# Patient Record
Sex: FEMALE | Race: BLACK OR AFRICAN AMERICAN | Employment: OTHER | URBAN - METROPOLITAN AREA
[De-identification: names, ages, dates, MRNs, and addresses within clinical notes are randomized per-mention and may not be internally consistent; named-entity substitution may affect disease eponyms.]

---

## 2017-08-16 ENCOUNTER — LAB ENCOUNTER (OUTPATIENT)
Dept: LAB | Age: 77
End: 2017-08-16
Attending: INTERNAL MEDICINE
Payer: MEDICARE

## 2017-08-16 DIAGNOSIS — R19.7 DIARRHEA: ICD-10-CM

## 2017-08-16 DIAGNOSIS — K21.9 GASTROESOPHAGEAL REFLUX DISEASE: ICD-10-CM

## 2017-08-16 DIAGNOSIS — R10.9 STOMACH ACHE: Primary | ICD-10-CM

## 2017-08-16 DIAGNOSIS — A04.72 C. DIFFICILE DIARRHEA: ICD-10-CM

## 2017-08-16 PROBLEM — I70.0 ABDOMINAL AORTIC ATHEROSCLEROSIS (HCC): Status: ACTIVE | Noted: 2017-08-16

## 2017-08-16 PROBLEM — Z86.19 HISTORY OF CLOSTRIDIUM DIFFICILE COLITIS: Status: ACTIVE | Noted: 2017-08-16

## 2017-08-16 PROBLEM — L28.0 LICHENOID DERMATITIS: Status: ACTIVE | Noted: 2017-08-16

## 2017-08-16 LAB
BILIRUB UR QL STRIP.AUTO: NEGATIVE
CLARITY UR REFRACT.AUTO: CLEAR
COLOR UR AUTO: YELLOW
GLUCOSE UR STRIP.AUTO-MCNC: NEGATIVE MG/DL
KETONES UR STRIP.AUTO-MCNC: NEGATIVE MG/DL
LEUKOCYTE ESTERASE UR QL STRIP.AUTO: NEGATIVE
NITRITE UR QL STRIP.AUTO: NEGATIVE
PH UR STRIP.AUTO: 6 [PH] (ref 4.5–8)
PROT UR STRIP.AUTO-MCNC: NEGATIVE MG/DL
RBC UR QL AUTO: NEGATIVE
SP GR UR STRIP.AUTO: 1.01 (ref 1–1.03)
UROBILINOGEN UR STRIP.AUTO-MCNC: <2 MG/DL

## 2017-08-16 PROCEDURE — 81003 URINALYSIS AUTO W/O SCOPE: CPT

## 2018-01-25 PROBLEM — I70.0 AORTIC CALCIFICATION (HCC): Status: ACTIVE | Noted: 2018-01-25

## 2018-01-25 PROBLEM — M47.816 LUMBAR FACET ARTHROPATHY: Status: ACTIVE | Noted: 2018-01-25

## 2019-03-21 PROBLEM — J84.10 PULMONARY GRANULOMA (HCC): Status: ACTIVE | Noted: 2019-03-21

## 2019-03-21 PROBLEM — M47.816 FACET ARTHRITIS OF LUMBAR REGION: Status: ACTIVE | Noted: 2019-03-21

## 2019-04-22 ENCOUNTER — HOSPITAL ENCOUNTER (OUTPATIENT)
Age: 79
Discharge: HOME | End: 2019-04-22
Attending: FAMILY MEDICINE
Payer: MEDICARE

## 2019-04-22 VITALS
WEIGHT: 115 LBS | BODY MASS INDEX: 22 KG/M2 | HEART RATE: 118 BPM | OXYGEN SATURATION: 96 % | SYSTOLIC BLOOD PRESSURE: 135 MMHG | TEMPERATURE: 98 F | DIASTOLIC BLOOD PRESSURE: 69 MMHG | RESPIRATION RATE: 18 BRPM
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
## 2019-04-22 DIAGNOSIS — S51.852A DOG BITE OF LEFT FOREARM, INITIAL ENCOUNTER: Primary | ICD-10-CM

## 2019-04-22 PROCEDURE — 99202 OFFICE O/P NEW SF 15 MIN: CPT

## 2019-04-22 PROCEDURE — 99203 OFFICE O/P NEW LOW 30 MIN: CPT

## 2019-04-22 RX ORDER — DOXYCYCLINE HYCLATE 100 MG/1
100 CAPSULE ORAL 2 TIMES DAILY
Qty: 20 CAPSULE | Refills: 0 | Status: SHIPPED | OUTPATIENT
Start: 2019-04-22 | End: 2019-05-02

## 2019-04-22 NOTE — ED PROVIDER NOTES
Patient Seen in: [facility] [place]    History   Patient presents with:  Bite (integumentary)    Stated Complaint: dog bite    HPI    Pt is an 77 yo with a Dog bite PTA. It is her neighbors dog and it is UTD on immunizations.  Pt Pulse 118   Temp 98.1 °F (36.7 °C) (Oral)   Resp 18   Wt 52.2 kg   SpO2 96%   BMI 22.46 kg/m²         Physical Exam   Constitutional: She is oriented to person, place, and time. She appears well-developed and well-nourished. HENT:   Head: Normocephalic.

## 2019-04-22 NOTE — ED INITIAL ASSESSMENT (HPI)
Pt was bit by neighbors dog a few hours ago. One puncture wound to L arm. Dog is up to date with shots.

## 2019-04-25 PROBLEM — H25.813 COMBINED FORMS OF AGE-RELATED CATARACT, BILATERAL: Status: ACTIVE | Noted: 2019-04-25

## 2019-04-25 PROBLEM — H04.123 DRY EYE SYNDROME OF BILATERAL LACRIMAL GLANDS: Status: ACTIVE | Noted: 2019-04-25

## 2019-04-25 PROBLEM — D31.22: Status: ACTIVE | Noted: 2019-04-25

## 2019-04-25 PROBLEM — H43.813 PVD (POSTERIOR VITREOUS DETACHMENT), BILATERAL: Status: ACTIVE | Noted: 2019-04-25

## 2020-07-01 PROBLEM — I70.0 ABDOMINAL AORTIC ATHEROSCLEROSIS (HCC): Status: RESOLVED | Noted: 2017-08-16 | Resolved: 2020-07-01

## 2021-08-04 ENCOUNTER — HOSPITAL ENCOUNTER (EMERGENCY)
Facility: HOSPITAL | Age: 81
Discharge: HOME | End: 2021-08-04
Attending: EMERGENCY MEDICINE
Payer: MEDICARE

## 2021-08-04 ENCOUNTER — APPOINTMENT (OUTPATIENT)
Dept: CT IMAGING | Facility: HOSPITAL | Age: 81
End: 2021-08-04
Attending: EMERGENCY MEDICINE
Payer: MEDICARE

## 2021-08-04 ENCOUNTER — HOSPITAL ENCOUNTER (OUTPATIENT)
Age: 81
Discharge: ANOTHER HOSPITAL | End: 2021-08-04
Payer: MEDICARE

## 2021-08-04 VITALS
RESPIRATION RATE: 18 BRPM | HEIGHT: 61 IN | OXYGEN SATURATION: 97 % | WEIGHT: 116 LBS | HEART RATE: 79 BPM | BODY MASS INDEX: 21.9 KG/M2 | SYSTOLIC BLOOD PRESSURE: 158 MMHG | TEMPERATURE: 98 F | DIASTOLIC BLOOD PRESSURE: 66 MMHG

## 2021-08-04 VITALS
OXYGEN SATURATION: 98 % | TEMPERATURE: 98 F | RESPIRATION RATE: 18 BRPM | SYSTOLIC BLOOD PRESSURE: 150 MMHG | HEART RATE: 64 BPM | DIASTOLIC BLOOD PRESSURE: 68 MMHG | BODY MASS INDEX: 23.39 KG/M2 | HEIGHT: 61 IN | WEIGHT: 123.88 LBS

## 2021-08-04 DIAGNOSIS — R10.9 ABDOMINAL PAIN, ACUTE: Primary | ICD-10-CM

## 2021-08-04 LAB
ALBUMIN SERPL-MCNC: 3.5 G/DL (ref 3.4–5)
ALBUMIN/GLOB SERPL: 0.9 {RATIO} (ref 1–2)
ALP LIVER SERPL-CCNC: 55 U/L
ALT SERPL-CCNC: 21 U/L
ANION GAP SERPL CALC-SCNC: 8 MMOL/L (ref 0–18)
AST SERPL-CCNC: 17 U/L (ref 15–37)
BASOPHILS # BLD AUTO: 0.04 X10(3) UL (ref 0–0.2)
BASOPHILS NFR BLD AUTO: 0.5 %
BILIRUB SERPL-MCNC: 0.6 MG/DL (ref 0.1–2)
BILIRUB UR QL: NEGATIVE
BUN BLD-MCNC: 12 MG/DL (ref 7–18)
BUN/CREAT SERPL: 13.6 (ref 10–20)
CALCIUM BLD-MCNC: 9.5 MG/DL (ref 8.5–10.1)
CHLORIDE SERPL-SCNC: 110 MMOL/L (ref 98–112)
CLARITY UR: CLEAR
CO2 SERPL-SCNC: 24 MMOL/L (ref 21–32)
COLOR UR: YELLOW
CREAT BLD-MCNC: 0.88 MG/DL
DEPRECATED RDW RBC AUTO: 45.4 FL (ref 35.1–46.3)
EOSINOPHIL # BLD AUTO: 0.67 X10(3) UL (ref 0–0.7)
EOSINOPHIL NFR BLD AUTO: 7.9 %
ERYTHROCYTE [DISTWIDTH] IN BLOOD BY AUTOMATED COUNT: 13.2 % (ref 11–15)
GLOBULIN PLAS-MCNC: 3.8 G/DL (ref 2.8–4.4)
GLUCOSE BLD-MCNC: 89 MG/DL (ref 70–99)
GLUCOSE UR-MCNC: NEGATIVE MG/DL
HAV IGM SER QL: 2.3 MG/DL (ref 1.6–2.6)
HCT VFR BLD AUTO: 39.8 %
HGB BLD-MCNC: 13.5 G/DL
HGB UR QL STRIP.AUTO: NEGATIVE
IMM GRANULOCYTES # BLD AUTO: 0.03 X10(3) UL (ref 0–1)
IMM GRANULOCYTES NFR BLD: 0.4 %
LEUKOCYTE ESTERASE UR QL STRIP.AUTO: NEGATIVE
LIPASE SERPL-CCNC: 85 U/L (ref 73–393)
LYMPHOCYTES # BLD AUTO: 1.18 X10(3) UL (ref 1–4)
LYMPHOCYTES NFR BLD AUTO: 13.9 %
M PROTEIN MFR SERPL ELPH: 7.3 G/DL (ref 6.4–8.2)
MCH RBC QN AUTO: 31.6 PG (ref 26–34)
MCHC RBC AUTO-ENTMCNC: 33.9 G/DL (ref 31–37)
MCV RBC AUTO: 93.2 FL
MONOCYTES # BLD AUTO: 0.91 X10(3) UL (ref 0.1–1)
MONOCYTES NFR BLD AUTO: 10.7 %
NEUTROPHILS # BLD AUTO: 5.68 X10 (3) UL (ref 1.5–7.7)
NEUTROPHILS # BLD AUTO: 5.68 X10(3) UL (ref 1.5–7.7)
NEUTROPHILS NFR BLD AUTO: 66.6 %
NITRITE UR QL STRIP.AUTO: NEGATIVE
OSMOLALITY SERPL CALC.SUM OF ELEC: 293 MOSM/KG (ref 275–295)
PH UR: 6 [PH] (ref 5–8)
PLATELET # BLD AUTO: 234 10(3)UL (ref 150–450)
POTASSIUM SERPL-SCNC: 3.6 MMOL/L (ref 3.5–5.1)
PROT UR-MCNC: NEGATIVE MG/DL
RBC # BLD AUTO: 4.27 X10(6)UL
SODIUM SERPL-SCNC: 142 MMOL/L (ref 136–145)
SP GR UR STRIP: 1.01 (ref 1–1.03)
TROPONIN I SERPL-MCNC: <0.045 NG/ML (ref ?–0.04)
UROBILINOGEN UR STRIP-ACNC: <2
WBC # BLD AUTO: 8.5 X10(3) UL (ref 4–11)

## 2021-08-04 PROCEDURE — 96374 THER/PROPH/DIAG INJ IV PUSH: CPT

## 2021-08-04 PROCEDURE — 80053 COMPREHEN METABOLIC PANEL: CPT | Performed by: EMERGENCY MEDICINE

## 2021-08-04 PROCEDURE — 93000 ELECTROCARDIOGRAM COMPLETE: CPT | Performed by: NURSE PRACTITIONER

## 2021-08-04 PROCEDURE — 74177 CT ABD & PELVIS W/CONTRAST: CPT | Performed by: EMERGENCY MEDICINE

## 2021-08-04 PROCEDURE — 83690 ASSAY OF LIPASE: CPT | Performed by: EMERGENCY MEDICINE

## 2021-08-04 PROCEDURE — 85025 COMPLETE CBC W/AUTO DIFF WBC: CPT | Performed by: EMERGENCY MEDICINE

## 2021-08-04 PROCEDURE — 81003 URINALYSIS AUTO W/O SCOPE: CPT | Performed by: EMERGENCY MEDICINE

## 2021-08-04 PROCEDURE — 83735 ASSAY OF MAGNESIUM: CPT | Performed by: EMERGENCY MEDICINE

## 2021-08-04 PROCEDURE — 99215 OFFICE O/P EST HI 40 MIN: CPT | Performed by: NURSE PRACTITIONER

## 2021-08-04 PROCEDURE — 84484 ASSAY OF TROPONIN QUANT: CPT | Performed by: EMERGENCY MEDICINE

## 2021-08-04 PROCEDURE — 99284 EMERGENCY DEPT VISIT MOD MDM: CPT

## 2021-08-04 RX ORDER — MORPHINE SULFATE 4 MG/ML
2 INJECTION, SOLUTION INTRAMUSCULAR; INTRAVENOUS ONCE
Status: DISCONTINUED | OUTPATIENT
Start: 2021-08-04 | End: 2021-08-04

## 2021-08-04 RX ORDER — DICYCLOMINE HCL 20 MG
20 TABLET ORAL 4 TIMES DAILY PRN
Qty: 30 TABLET | Refills: 0 | Status: SHIPPED | OUTPATIENT
Start: 2021-08-04 | End: 2021-08-17

## 2021-08-04 NOTE — ED PROVIDER NOTES
Patient Seen in: [facility] Emergency Department      History   Patient presents with:  Abdomen/Flank Pain    Stated Complaint: abdominal pain     HPI/Subjective:   HPI    47-year-old female presents for evaluation for abdominal pain.   Patient sta [facility] PREOPERATIVE ORDER FOR IV ANTIBIOTIC SURGICAL SITE INFECTION PROPHYLAXIS.  5/29/2013    Procedure: COLONOSCOPY, POSSIBLE BIOPSY, POSSIBLE POLYPECTOMY 41900;  Surgeon: Benjamin Gonzales MD;  Location: [place] [address] right lower quadrant. There is no right CVA tenderness, left CVA tenderness, guarding or rebound. Musculoskeletal:         General: No deformity. Normal range of motion.       Cervical back: Full passive range of motion without pain and normal range of mo for immediate care reviewed and also unremarkable. CT imaging negative for any acute findings. Patient was no longer symptomatic. Unsure etiology for his symptoms at this time however she is stable for discharge home.   She is given prescription for [facility] atrophy. ADRENALS: No defined mass or abnormal enlargement. KIDNEYS: Enhance symmetrically without hydronephrosis. Small bilateral renal cysts are noted including a lower pole right renal cyst measuring up to 1.5 cm.   Additional hypodensities in the kid Disposition and Plan     Clinical Impression:  Abdominal pain, acute  (primary encounter diagnosis)     Disposition:  Discharge  8/4/2021  6:11 pm    Follow-up:  Jenny López MD  [address]

## 2021-08-04 NOTE — ED INITIAL ASSESSMENT (HPI)
Pt to ED via EMS from [address] [place] [address] Way with c/o intermittent mid epigastric pain/generalized abdominal cramping for 2 days. Pt states not passing gas. Pt states decreased Bowel movements. Pt denies fall or trauma. Pt denies diarrhea or vomiting. Pt skin parameters WNL.

## 2021-08-04 NOTE — ED PROVIDER NOTES
Patient presents with:  Abdominal Pain      HPI:     This 78year old female presents with a chief complaint of abdominal and chest pain that started yesterday. The patient states the pain has gradually become worse.   She has episodes of pain that she is Yes        Bike Helmet: Not Asked        Seat Belt: Yes        Self-Exams: Yes        Grew up on a farm: Not Asked        History of tanning: Not Asked        Outdoor occupation: Not Asked        Pt has a pacemaker: Not Asked        Pt has a defibrillator: Nonlabored resps. CARDIO: RRR without murmur  EXTREMITIES: no cyanosis or edema. CONRAD without difficulty  GI: Abdomen soft, tender to palpate over the entire abdomen. Bowel sounds normal. No CVA tenderness. NEURO: No deficits.     MDM/Assessment/Plan:   George Devries Troponin I    Collection Time: 08/04/21  3:13 PM   Result Value Ref Range    Troponin <0.045 <0.045 ng/mL   CBC W/ DIFFERENTIAL    Collection Time: 08/04/21  3:13 PM   Result Value Ref Range    WBC 8.5 4.0 - 11.0 x10(3) uL    RBC 4.27 3.80 - 5.30 x10(6)u

## 2021-08-16 PROBLEM — I77.1 TORTUOUS AORTA (HCC): Status: ACTIVE | Noted: 2021-08-16

## 2023-11-28 ENCOUNTER — OFFICE VISIT (OUTPATIENT)
Dept: FAMILY MEDICINE CLINIC | Facility: CLINIC | Age: 83
End: 2023-11-28

## 2023-11-28 VITALS
SYSTOLIC BLOOD PRESSURE: 130 MMHG | BODY MASS INDEX: 22.47 KG/M2 | HEART RATE: 80 BPM | WEIGHT: 119 LBS | DIASTOLIC BLOOD PRESSURE: 86 MMHG | HEIGHT: 61 IN

## 2023-11-28 DIAGNOSIS — R19.00 MASS OF SOFT TISSUE OF ABDOMEN: Primary | ICD-10-CM

## 2023-11-28 DIAGNOSIS — L30.9 DERMATITIS: ICD-10-CM

## 2023-11-28 PROCEDURE — 1126F AMNT PAIN NOTED NONE PRSNT: CPT | Performed by: NURSE PRACTITIONER

## 2023-11-28 PROCEDURE — 3079F DIAST BP 80-89 MM HG: CPT | Performed by: NURSE PRACTITIONER

## 2023-11-28 PROCEDURE — 99204 OFFICE O/P NEW MOD 45 MIN: CPT | Performed by: NURSE PRACTITIONER

## 2023-11-28 PROCEDURE — 3075F SYST BP GE 130 - 139MM HG: CPT | Performed by: NURSE PRACTITIONER

## 2023-11-28 PROCEDURE — 1159F MED LIST DOCD IN RCRD: CPT | Performed by: NURSE PRACTITIONER

## 2023-11-28 PROCEDURE — 1160F RVW MEDS BY RX/DR IN RCRD: CPT | Performed by: NURSE PRACTITIONER

## 2023-11-28 PROCEDURE — 3008F BODY MASS INDEX DOCD: CPT | Performed by: NURSE PRACTITIONER

## 2023-11-28 RX ORDER — TRIAMCINOLONE ACETONIDE 5 MG/G
1 OINTMENT TOPICAL 2 TIMES DAILY PRN
Qty: 30 G | Refills: 1 | Status: SHIPPED | OUTPATIENT
Start: 2023-11-28

## 2023-12-29 ENCOUNTER — TELEPHONE (OUTPATIENT)
Dept: SURGERY | Facility: CLINIC | Age: 83
End: 2023-12-29

## 2024-01-08 ENCOUNTER — OFFICE VISIT (OUTPATIENT)
Dept: SURGERY | Facility: CLINIC | Age: 84
End: 2024-01-08

## 2024-01-08 VITALS — HEIGHT: 61 IN | BODY MASS INDEX: 22.47 KG/M2 | WEIGHT: 119 LBS

## 2024-01-08 DIAGNOSIS — M79.89 SOFT TISSUE MASS: Primary | ICD-10-CM

## 2024-01-08 PROCEDURE — 99204 OFFICE O/P NEW MOD 45 MIN: CPT | Performed by: SURGERY

## 2024-01-08 PROCEDURE — 1159F MED LIST DOCD IN RCRD: CPT | Performed by: SURGERY

## 2024-01-08 PROCEDURE — 1160F RVW MEDS BY RX/DR IN RCRD: CPT | Performed by: SURGERY

## 2024-01-08 PROCEDURE — 3008F BODY MASS INDEX DOCD: CPT | Performed by: SURGERY

## 2024-01-08 NOTE — H&P
Chief complaint:   Chief Complaint   Patient presents with    Mass     Mass, Left side       HPI: Cassi has a soft tissue mass of the L flank area. Fairly stable, present for years.     Past medical history:   Past Medical History:   Diagnosis Date    Abdominal aortic atherosclerosis (HCC) 2017    Anxiety     Aortic calcification (HCC) 2018    Clostridium difficile colitis 2013    Facet arthritis of lumbar region 3/21/2019    History of Clostridium difficile colitis 2017    Lichenoid dermatitis 2017    Lumbar facet arthropathy 2018    MENOPAUSE     Pulmonary granuloma (HCC) 3/21/2019       Past surgical history:   Past Surgical History:   Procedure Laterality Date    COLONOSCOPY      nl    COLONOSCOPY      diverticulosis; no repeat given age    COLONOSCOPY,BIOPSY  2013    Procedure: COLONOSCOPY, POSSIBLE BIOPSY, POSSIBLE POLYPECTOMY 21483;  Surgeon: Bernard Carrera MD;  Location: [facility]    EXCISIONAL BIOPSY LEFT      agea 18 abcess removed per pt    HYSTERECTOMY  1971    PARTIAL HYSTERCTOMY FOR FIBROIDS          OTHER SURGICAL HISTORY      LIPOMA-RIGHT UPPER BACK    OTHER SURGICAL HISTORY      LEFT BREAST ABSCESS    PATIENT DOCUMENTED NOT TO HAVE EXPERIENCED ANY OF THE FOLLOWING EVENTS  2013    Procedure: COLONOSCOPY, POSSIBLE BIOPSY, POSSIBLE POLYPECTOMY 60395;  Surgeon: Bernard Carrera MD;  Location: [facility]    PATIENT WITHOUGH PREOPERATIVE ORDER FOR IV ANTIBIOTIC SURGICAL SITE INFECTION PROPHYLAXIS.  2013    Procedure: COLONOSCOPY, POSSIBLE BIOPSY, POSSIBLE POLYPECTOMY 19100;  Surgeon: Bernard Carrera MD;  Location: [facility]       Allergies:   Allergies   Allergen Reactions    Sulfa Antibiotics HIVES    Pcn [Penicillins] SWELLING       Medications:   Current Outpatient Medications   Medication Sig Dispense Refill    triamcinolone 0.5 % External Ointment Apply 1 Application topically 2 (two)  times daily as needed. 30 g 1    amLODIPine 5 MG Oral Tab Take 1 tablet (5 mg total) by mouth daily. 30 tablet 0    saccharomyces boulardii (FLORASTOR) 250 MG Oral Cap Take 1 capsule (250 mg total) by mouth 2 (two) times daily for 7 days. 180 capsule 3       Social history:   Social History     Socioeconomic History    Marital status:    Occupational History    Occupation: RETIRED   Tobacco Use    Smoking status: Never    Smokeless tobacco: Never   Substance and Sexual Activity    Alcohol use: Yes     Alcohol/week: 10.0 standard drinks of alcohol     Types: 10 Standard drinks or equivalent per week     Comment: moderate    Drug use: No    Sexual activity: Yes     Partners: Male     Comment: Jehovah's witness        Family history:  Family History   Problem Relation Age of Onset    Hypertension Mother     Obesity Mother     Heart Disorder Mother     Other (Other) Son         EARLY HIP PROBLEMS    No Known Problems Daughter     No Known Problems Son     Cancer Brother         bone     Other (lead poisoning) Son     Obesity Brother         Review of Systems:   GENERAL: feels generally well  SKIN: no ulcerated or worrisome skin lesions  EYES:denies blurred vision or double vision  HEENT: denies new nasal congestion, sinus pain or ST  LUNGS: denies shortness of breath with exertion  CARDIOVASCULAR: denies chest pain on exertion  GI: no hematemesis, no BRBPR, no worsening heartburn  : no dysuria, no blood in urine, no difficulty urinating  MUSCULOSKELETAL: no new musculoskeletal complaints  NEURO: no persistent, recurrent  headaches  PSYCHE:no depression or anxiety  HEMATOLOGIC: no hx of blood dyscrasia  ENDOCRINE: no new endocrine problems  ALL/ASTHMA: no new hx of severe allergy or asthma  BACK: normal, no spinal deformity, no CVA tenderness    Physical examination:     Constitutional: appears well hydrated alert and responsive no acute distress noted  HEENT wnl, anicteric, PERRL, normocephalic,  atraumatic  Neck supple, norm ROM, no JVD  L CTA B  H Reg rate  Abd soft, NT, ND, no masses, no hernias, no HSM.  Extr no c/c/e  Skin intact, no jaundice, no rashes, no lesions  Neuro grossly intact, no focal deficits, no tremors  Back no deformity, no CVA tnd. 2 cm oft tissue mass L flank/back         Assessment and plan:  Diagnoses and all orders for this visit:    Soft tissue mass  -     US BACK LOWER/ABD WALL(CPT=76705); Future       Probable lipoma, obtain US. RTC.  Ddx and management discussed. Possible observation discussed.     Jimena Leahy MD  1/8/2024  11:01 AM

## 2024-02-06 ENCOUNTER — HOSPITAL ENCOUNTER (OUTPATIENT)
Age: 84
Discharge: HOME | End: 2024-02-06
Payer: MEDICARE

## 2024-02-06 ENCOUNTER — APPOINTMENT (OUTPATIENT)
Dept: GENERAL RADIOLOGY | Age: 84
End: 2024-02-06
Attending: PHYSICIAN ASSISTANT
Payer: MEDICARE

## 2024-02-06 VITALS
HEART RATE: 74 BPM | OXYGEN SATURATION: 96 % | RESPIRATION RATE: 18 BRPM | TEMPERATURE: 98 F | SYSTOLIC BLOOD PRESSURE: 144 MMHG | DIASTOLIC BLOOD PRESSURE: 87 MMHG

## 2024-02-06 DIAGNOSIS — B34.9 VIRAL ILLNESS: ICD-10-CM

## 2024-02-06 DIAGNOSIS — R05.9 COUGH: Primary | ICD-10-CM

## 2024-02-06 PROCEDURE — 99213 OFFICE O/P EST LOW 20 MIN: CPT | Performed by: PHYSICIAN ASSISTANT

## 2024-02-06 PROCEDURE — 71046 X-RAY EXAM CHEST 2 VIEWS: CPT | Performed by: PHYSICIAN ASSISTANT

## 2024-02-06 NOTE — ED PROVIDER NOTES
Patient Seen in: [facility]      History     Chief Complaint   Patient presents with    Cough/URI     Stated Complaint: coughing    Subjective:   HPI    Patient is a healthy 83-year-old female that presents to immediate care due to sinus congestion rhinorrhea cough x 3 days.  Denies known sick contacts.  Has been taking DayQuil and NyQuil with moderate relief.  Patient states her cough has been \"rattling\" at home today which prompted her to come to immediate care for evaluation.  Denies fever chest pain shortness of breath lower leg edema orthopnea.    Objective:   No pertinent past medical history.            No pertinent past surgical history.              No pertinent social history.            Review of Systems    Positive for stated complaint: coughing  Other systems are as noted in HPI.  Constitutional and vital signs reviewed.      All other systems reviewed and negative except as noted above.    Physical Exam     ED Triage Vitals [02/06/24 1253]   /87   Pulse 74   Resp 18   Temp 97.5 °F (36.4 °C)   Temp src Temporal   SpO2 96 %   O2 Device None (Room air)       Current:/87   Pulse 74   Temp 97.5 °F (36.4 °C) (Temporal)   Resp 18   SpO2 96%         Physical Exam    Vital signs reviewed. Nursing note reviewed.  Constitutional: Well-developed. Well-nourished. In no acute distress  HENT: Mucous membranes moist. TMs intact bilaterally. No trismus. Uvula midline. Mild posterior pharynx erythema.  No petechiae, exudates, or posterior pharynx edema.  EYES: No scleral icterus or conjunctival injection.  NECK: Full ROM. Supple.   CARDIAC: Normal rate. Normal S1/ S2. 2+ distal pulses. No edema  PULM/CHEST: Clear to auscultation bilaterally. No wheezes  Extremities: Full ROM  NEURO: Awake, alert, following commands, moving extremities, answering questions.   SKIN: Warm and dry. No rash or lesions.  PSYCH: Normal judgment. Normal affect.                    [facility]      Patient is a 83-year-old  female that presents to immediate care due to cough and sinus congestion x 3 days.  Patient arrives with stable vitals speaking complete sentences in no respiratory distress.  Physical exam showing lungs clear to auscultation no lower leg edema.  Less likely acute viral illness, consider COVID-19 however patient declined testing at this time.  Will obtain chest x-ray to rule out pneumonia.  Less likely ACS, acute CHF with no peripheral edema and patient denying chest pain or shortness of breath.  History given by patient.            ED Course   Labs Reviewed - No data to display  2:04 PM  Discussed reassuring chest x-ray showing no consolidation or pneumonia.  Images personally reviewed by me.  Most likely viral illness.  Discussed with patient supportive treatment at home PCP follow-up in 1 to 2 days.  Patient agreeable to plan all questions answered return protocols including worsening cough fever chest pain shortness of breath                            Medical Decision Making      Disposition and Plan     Clinical Impression:  1. Cough    2. Viral illness         Disposition:  Discharge  2/6/2024  2:00 pm    Follow-up:  Payal Delatorre MD  [address] [place] [address]  [phone]    Schedule an appointment as soon as possible for a visit             Medications Prescribed:  Discharge Medication List as of 2/6/2024  2:01 PM

## 2024-02-20 ENCOUNTER — OFFICE VISIT (OUTPATIENT)
Dept: FAMILY MEDICINE CLINIC | Facility: CLINIC | Age: 84
End: 2024-02-20

## 2024-02-20 VITALS
DIASTOLIC BLOOD PRESSURE: 74 MMHG | HEART RATE: 66 BPM | WEIGHT: 119 LBS | HEIGHT: 61 IN | BODY MASS INDEX: 22.47 KG/M2 | SYSTOLIC BLOOD PRESSURE: 130 MMHG

## 2024-02-20 DIAGNOSIS — Z01.818 PREOPERATIVE EXAMINATION: Primary | ICD-10-CM

## 2024-02-20 PROCEDURE — 1160F RVW MEDS BY RX/DR IN RCRD: CPT | Performed by: NURSE PRACTITIONER

## 2024-02-20 PROCEDURE — 3008F BODY MASS INDEX DOCD: CPT | Performed by: NURSE PRACTITIONER

## 2024-02-20 PROCEDURE — 99213 OFFICE O/P EST LOW 20 MIN: CPT | Performed by: NURSE PRACTITIONER

## 2024-02-20 PROCEDURE — 1159F MED LIST DOCD IN RCRD: CPT | Performed by: NURSE PRACTITIONER

## 2024-02-20 PROCEDURE — 1126F AMNT PAIN NOTED NONE PRSNT: CPT | Performed by: NURSE PRACTITIONER

## 2024-02-20 PROCEDURE — 3078F DIAST BP <80 MM HG: CPT | Performed by: NURSE PRACTITIONER

## 2024-02-20 PROCEDURE — 3075F SYST BP GE 130 - 139MM HG: CPT | Performed by: NURSE PRACTITIONER

## 2024-02-20 NOTE — PROGRESS NOTES
HPI    Patient presents for preop clearance.  Scheduled to have cataract surgery of the left eye with Sammy Witt.  Date is to be determined pending surgical clearance.  Patient denies history of problems with anesthesia.  Negative for significant past history other than skin dermatitis, otherwise healthy overall.      Review of Systems   All other systems reviewed and are negative.       Vitals:    24 1024 24 1054   BP: 147/75 130/74   Pulse: 66    Weight: 119 lb (54 kg)    Height: 5' 1\" (1.549 m)      Body mass index is 22.48 kg/m².    Health Maintenance   Topic Date Due    Zoster Vaccines (1 of 2) Never done    COVID-19 Vaccine ( -  season) Never done    Influenza Vaccine (1) 10/01/2023    MA Annual Health Assessment  2024    Annual Depression Screening  2024    Fall Risk Screening (Annual)  2024    DEXA Scan  Completed    Pneumococcal Vaccine: 65+ Years  Completed       No LMP recorded. Patient is postmenopausal.    Past Medical History:   Diagnosis Date    Abdominal aortic atherosclerosis (HCC) 2017    Anxiety     Aortic calcification (HCC) 2018    Clostridium difficile colitis 2013    Facet arthritis of lumbar region 3/21/2019    History of Clostridium difficile colitis 2017    Lichenoid dermatitis 2017    Lumbar facet arthropathy 2018    MENOPAUSE     Pulmonary granuloma (HCC) 3/21/2019       .  Past Surgical History:   Procedure Laterality Date    Colonoscopy      nl    Colonoscopy      diverticulosis; no repeat given age    Colonoscopy,biopsy  2013    Procedure: COLONOSCOPY, POSSIBLE BIOPSY, POSSIBLE POLYPECTOMY 16236;  Surgeon: Bernard Carrera MD;  [facility]    Excisional biopsy left      agea 18 abcess removed per pt    Hysterectomy  1971    PARTIAL HYSTERCTOMY FOR FIBROIDS          Other surgical history      LIPOMA-RIGHT UPPER BACK    Other surgical history      LEFT BREAST ABSCESS     Patient documented not to have experienced any of the following events  5/29/2013    Procedure: COLONOSCOPY, POSSIBLE BIOPSY, POSSIBLE POLYPECTOMY 08897;  Surgeon: Bernard Carrera MD;  Location: [facility]    Patient withough preoperative order for iv antibiotic surgical site infection prophylaxis.  5/29/2013    Procedure: COLONOSCOPY, POSSIBLE BIOPSY, POSSIBLE POLYPECTOMY 59316;  Surgeon: Bernard Carrera MD;  Location: [facility]       Family History   Problem Relation Age of Onset    Hypertension Mother     Obesity Mother     Heart Disorder Mother     Other (Other) Son         EARLY HIP PROBLEMS    No Known Problems Daughter     No Known Problems Son     Cancer Brother         bone     Other (lead poisoning) Son     Obesity Brother        Social History     Socioeconomic History    Marital status:      Spouse name: Not on file    Number of children: Not on file    Years of education: Not on file    Highest education level: Not on file   Occupational History    Occupation: RETIRED   Tobacco Use    Smoking status: Never    Smokeless tobacco: Never   Substance and Sexual Activity    Alcohol use: Yes     Alcohol/week: 10.0 standard drinks of alcohol     Types: 10 Standard drinks or equivalent per week     Comment: moderate    Drug use: No    Sexual activity: Yes     Partners: Male     Comment: [employer]   Other Topics Concern     Service No    Blood Transfusions No    Caffeine Concern Yes     Comment: 2-3 CUP COFFEE/DAY    Occupational Exposure No    Hobby Hazards No    Sleep Concern No    Stress Concern No    Weight Concern No    Special Diet No    Back Care Not Asked    Exercise Yes    Bike Helmet Not Asked    Seat Belt Yes    Self-Exams Yes    Grew up on a farm Not Asked    History of tanning Not Asked    Outdoor occupation Not Asked    Pt has a pacemaker Not Asked    Pt has a defibrillator Not Asked    Breast feeding Not Asked    Reaction to local  anesthetic Not Asked   Social History Narrative    , 4 children, does  some     Social Determinants of Health     Financial Resource Strain: Not on file   Food Insecurity: Not on file   Transportation Needs: Not on file   Physical Activity: Not on file   Stress: Not on file   Social Connections: Not on file   Housing Stability: Not on file       No current outpatient medications on file.       Allergies:  Allergies   Allergen Reactions    Sulfa Antibiotics HIVES    Pcn [Penicillins] SWELLING       Physical Exam  Vitals and nursing note reviewed.   Constitutional:       General: She is not in acute distress.     Appearance: Normal appearance. She is well-developed. She is not ill-appearing, toxic-appearing or diaphoretic.   HENT:      Head: Normocephalic and atraumatic.      Right Ear: Hearing, tympanic membrane, ear canal and external ear normal. There is no impacted cerumen.      Left Ear: Hearing, tympanic membrane, ear canal and external ear normal. There is no impacted cerumen.      Nose: Nose normal.      Mouth/Throat:      Mouth: Mucous membranes are moist.      Pharynx: Oropharynx is clear. No oropharyngeal exudate or posterior oropharyngeal erythema.   Eyes:      General:         Right eye: No discharge.         Left eye: No discharge.      Conjunctiva/sclera: Conjunctivae normal.   Neck:      Thyroid: No thyromegaly.   Cardiovascular:      Rate and Rhythm: Normal rate. Rhythm irregular.   Pulmonary:      Effort: Pulmonary effort is normal. No respiratory distress.      Breath sounds: Normal breath sounds. No stridor. No wheezing, rhonchi or rales.   Chest:      Chest wall: No tenderness.   Abdominal:      General: Abdomen is flat. Bowel sounds are normal. There is no distension.      Palpations: Abdomen is soft. There is no mass.      Tenderness: There is no abdominal tenderness. There is no guarding or rebound.      Hernia: No hernia is present.   Musculoskeletal:         General: Normal range  of motion.      Cervical back: Normal range of motion and neck supple.   Lymphadenopathy:      Cervical: No cervical adenopathy.   Skin:     General: Skin is warm and dry.   Neurological:      Mental Status: She is alert and oriented to person, place, and time.   Psychiatric:         Mood and Affect: Mood normal.         Behavior: Behavior normal.         Thought Content: Thought content normal.         Judgment: Judgment normal.          Assessment and Plan:  Problem List Items Addressed This Visit    None  Visit Diagnoses       Preoperative examination    -  Primary    Relevant Orders    EKG 12 Lead    CBC With Differential With Platelet    Basic Metabolic Panel (8)           Cleared for surgery pending normal labs, minimal risk.  Cosigned by Dr Duy Martin. DO.       Discussed plan of care with patient and patient is in agreement.  All questions answered. Patient to call with questions or concerns.    Encouraged to sign up for My Chart if not already registered.

## 2024-02-21 ENCOUNTER — HOSPITAL ENCOUNTER (OUTPATIENT)
Dept: ULTRASOUND IMAGING | Facility: HOSPITAL | Age: 84
Discharge: HOME | End: 2024-02-21
Attending: SURGERY
Payer: MEDICARE

## 2024-02-21 ENCOUNTER — EKG ENCOUNTER (OUTPATIENT)
Dept: LAB | Age: 84
End: 2024-02-21
Attending: SURGERY
Payer: MEDICARE

## 2024-02-21 ENCOUNTER — LAB ENCOUNTER (OUTPATIENT)
Dept: LAB | Age: 84
End: 2024-02-21
Attending: SURGERY
Payer: MEDICARE

## 2024-02-21 DIAGNOSIS — Z01.818 PREOPERATIVE EXAMINATION: ICD-10-CM

## 2024-02-21 DIAGNOSIS — M79.89 SOFT TISSUE MASS: ICD-10-CM

## 2024-02-21 LAB
ANION GAP SERPL CALC-SCNC: 4 MMOL/L (ref 0–18)
ATRIAL RATE: 64 BPM
BASOPHILS # BLD: 0.21 X10(3) UL (ref 0–0.2)
BASOPHILS NFR BLD: 3 %
BUN BLD-MCNC: 13 MG/DL (ref 9–23)
BUN/CREAT SERPL: 12.6 (ref 10–20)
CALCIUM BLD-MCNC: 9.3 MG/DL (ref 8.7–10.4)
CHLORIDE SERPL-SCNC: 109 MMOL/L (ref 98–112)
CO2 SERPL-SCNC: 27 MMOL/L (ref 21–32)
CREAT BLD-MCNC: 1.03 MG/DL
DEPRECATED RDW RBC AUTO: 43.5 FL (ref 35.1–46.3)
EGFRCR SERPLBLD CKD-EPI 2021: 53 ML/MIN/1.73M2 (ref 60–?)
EOSINOPHIL # BLD: 0.85 X10(3) UL (ref 0–0.7)
EOSINOPHIL NFR BLD: 12 %
ERYTHROCYTE [DISTWIDTH] IN BLOOD BY AUTOMATED COUNT: 13 % (ref 11–15)
FASTING STATUS PATIENT QL REPORTED: YES
GLUCOSE BLD-MCNC: 92 MG/DL (ref 70–99)
HCT VFR BLD AUTO: 39.6 %
HGB BLD-MCNC: 13.2 G/DL
LYMPHOCYTES NFR BLD: 0.99 X10(3) UL (ref 1–4)
LYMPHOCYTES NFR BLD: 14 %
MCH RBC QN AUTO: 30.6 PG (ref 26–34)
MCHC RBC AUTO-ENTMCNC: 33.3 G/DL (ref 31–37)
MCV RBC AUTO: 91.7 FL
MONOCYTES # BLD: 0.57 X10(3) UL (ref 0.1–1)
MONOCYTES NFR BLD: 8 %
MORPHOLOGY: NORMAL
NEUTROPHILS # BLD AUTO: 3.67 X10 (3) UL (ref 1.5–7.7)
NEUTROPHILS NFR BLD: 62 %
NEUTS BAND NFR BLD: 1 %
NEUTS HYPERSEG # BLD: 4.47 X10(3) UL (ref 1.5–7.7)
OSMOLALITY SERPL CALC.SUM OF ELEC: 290 MOSM/KG (ref 275–295)
P AXIS: 65 DEGREES
P-R INTERVAL: 198 MS
PLATELET # BLD AUTO: 265 10(3)UL (ref 150–450)
PLATELET MORPHOLOGY: NORMAL
POTASSIUM SERPL-SCNC: 4.2 MMOL/L (ref 3.5–5.1)
Q-T INTERVAL: 414 MS
QRS DURATION: 86 MS
QTC CALCULATION (BEZET): 427 MS
R AXIS: -24 DEGREES
RBC # BLD AUTO: 4.32 X10(6)UL
SODIUM SERPL-SCNC: 140 MMOL/L (ref 136–145)
T AXIS: 66 DEGREES
TOTAL CELLS COUNTED BLD: 100
VENTRICULAR RATE: 64 BPM
WBC # BLD AUTO: 7.1 X10(3) UL (ref 4–11)

## 2024-02-21 PROCEDURE — 36415 COLL VENOUS BLD VENIPUNCTURE: CPT

## 2024-02-21 PROCEDURE — 85060 BLOOD SMEAR INTERPRETATION: CPT

## 2024-02-21 PROCEDURE — 85025 COMPLETE CBC W/AUTO DIFF WBC: CPT

## 2024-02-21 PROCEDURE — 93010 ELECTROCARDIOGRAM REPORT: CPT | Performed by: INTERNAL MEDICINE

## 2024-02-21 PROCEDURE — 80048 BASIC METABOLIC PNL TOTAL CA: CPT

## 2024-02-21 PROCEDURE — 93005 ELECTROCARDIOGRAM TRACING: CPT

## 2024-02-21 PROCEDURE — 76705 ECHO EXAM OF ABDOMEN: CPT | Performed by: SURGERY

## 2024-02-21 PROCEDURE — 85007 BL SMEAR W/DIFF WBC COUNT: CPT

## 2024-02-21 PROCEDURE — 85027 COMPLETE CBC AUTOMATED: CPT

## 2024-04-25 ENCOUNTER — TELEPHONE (OUTPATIENT)
Dept: FAMILY MEDICINE CLINIC | Facility: CLINIC | Age: 84
End: 2024-04-25

## 2024-04-25 NOTE — TELEPHONE ENCOUNTER
Patient asking us to fax her pre-op clearance to:    [facility]  Surgery will be in June    Fax: [phone]

## 2024-04-27 NOTE — TELEPHONE ENCOUNTER
Called patient no answer. Patient's last  pre-op was last done on 02/20. Pre-op clearances are only good for 30 days.  Patient needs to schedule another pre-op appointment within 30 days from new surgery date. Left message for patient to return call and schedule appointment. If patient calls back please assist in scheduling appointment.

## 2024-04-29 NOTE — TELEPHONE ENCOUNTER
Patient called back, and was notified as below.  She says she will have cataract surgery in June but not sure which date.  Scheduled:  Future Appointments   Date Time Provider Department Center   5/28/2024  7:00 PM Cinthya Lyles APRN [facility]     She wanted late appointment due to her car not working.

## 2024-05-28 ENCOUNTER — OFFICE VISIT (OUTPATIENT)
Dept: FAMILY MEDICINE CLINIC | Facility: CLINIC | Age: 84
End: 2024-05-28

## 2024-05-28 VITALS
HEIGHT: 61 IN | HEART RATE: 82 BPM | BODY MASS INDEX: 22.28 KG/M2 | WEIGHT: 118 LBS | SYSTOLIC BLOOD PRESSURE: 124 MMHG | DIASTOLIC BLOOD PRESSURE: 80 MMHG

## 2024-05-28 DIAGNOSIS — Z01.818 PRE-OP EXAMINATION: Primary | ICD-10-CM

## 2024-05-28 DIAGNOSIS — H60.542 DERMATITIS OF EAR CANAL, LEFT: ICD-10-CM

## 2024-05-28 DIAGNOSIS — H25.813 COMBINED FORMS OF AGE-RELATED CATARACT, BILATERAL: ICD-10-CM

## 2024-05-28 PROBLEM — N18.30 CKD (CHRONIC KIDNEY DISEASE) STAGE 3, GFR 30-59 ML/MIN (HCC): Chronic | Status: ACTIVE | Noted: 2024-05-28

## 2024-05-28 PROCEDURE — 1126F AMNT PAIN NOTED NONE PRSNT: CPT | Performed by: NURSE PRACTITIONER

## 2024-05-28 PROCEDURE — 3079F DIAST BP 80-89 MM HG: CPT | Performed by: NURSE PRACTITIONER

## 2024-05-28 PROCEDURE — 99214 OFFICE O/P EST MOD 30 MIN: CPT | Performed by: NURSE PRACTITIONER

## 2024-05-28 PROCEDURE — 3074F SYST BP LT 130 MM HG: CPT | Performed by: NURSE PRACTITIONER

## 2024-05-28 PROCEDURE — 1159F MED LIST DOCD IN RCRD: CPT | Performed by: NURSE PRACTITIONER

## 2024-05-28 PROCEDURE — 3008F BODY MASS INDEX DOCD: CPT | Performed by: NURSE PRACTITIONER

## 2024-05-28 RX ORDER — OFLOXACIN 3 MG/ML
SOLUTION/ DROPS OPHTHALMIC
COMMUNITY
Start: 2024-05-14

## 2024-05-28 RX ORDER — PREDNISOLONE ACETATE 10 MG/ML
SUSPENSION/ DROPS OPHTHALMIC
COMMUNITY
Start: 2024-05-14

## 2024-05-28 RX ORDER — KETOROLAC TROMETHAMINE 5 MG/ML
1 SOLUTION OPHTHALMIC 2 TIMES DAILY
COMMUNITY
Start: 2024-03-12

## 2024-05-28 RX ORDER — TRIAMCINOLONE ACETONIDE 1 MG/G
OINTMENT TOPICAL
Qty: 15 G | Refills: 3 | Status: SHIPPED | OUTPATIENT
Start: 2024-05-28

## 2024-05-28 RX ORDER — KETOROLAC TROMETHAMINE 5 MG/ML
SOLUTION OPHTHALMIC
COMMUNITY
Start: 2024-05-14

## 2024-05-29 ENCOUNTER — OFFICE VISIT (OUTPATIENT)
Dept: FAMILY MEDICINE CLINIC | Facility: CLINIC | Age: 84
End: 2024-05-29

## 2024-05-29 ENCOUNTER — NURSE TRIAGE (OUTPATIENT)
Dept: FAMILY MEDICINE CLINIC | Facility: CLINIC | Age: 84
End: 2024-05-29

## 2024-05-29 VITALS
SYSTOLIC BLOOD PRESSURE: 160 MMHG | HEIGHT: 61 IN | BODY MASS INDEX: 22.28 KG/M2 | DIASTOLIC BLOOD PRESSURE: 100 MMHG | WEIGHT: 118 LBS | HEART RATE: 78 BPM

## 2024-05-29 DIAGNOSIS — R03.0 ELEVATED BLOOD PRESSURE READING IN OFFICE WITHOUT DIAGNOSIS OF HYPERTENSION: ICD-10-CM

## 2024-05-29 DIAGNOSIS — N89.8 VAGINAL DISCHARGE: Primary | ICD-10-CM

## 2024-05-29 LAB
BILIRUBIN: NEGATIVE
GLUCOSE (URINE DIPSTICK): NEGATIVE MG/DL
KETONES (URINE DIPSTICK): NEGATIVE MG/DL
MULTISTIX LOT#: ABNORMAL NUMERIC
NITRITE, URINE: NEGATIVE
OCCULT BLOOD: NEGATIVE
PH, URINE: 5.5 (ref 4.5–8)
PROTEIN (URINE DIPSTICK): NEGATIVE MG/DL
SPECIFIC GRAVITY: 1.02 (ref 1–1.03)
URINE-COLOR: YELLOW
UROBILINOGEN,SEMI-QN: 0.2 MG/DL (ref 0–1.9)

## 2024-05-29 PROCEDURE — 99214 OFFICE O/P EST MOD 30 MIN: CPT | Performed by: NURSE PRACTITIONER

## 2024-05-29 PROCEDURE — 3080F DIAST BP >= 90 MM HG: CPT | Performed by: NURSE PRACTITIONER

## 2024-05-29 PROCEDURE — G2211 COMPLEX E/M VISIT ADD ON: HCPCS | Performed by: NURSE PRACTITIONER

## 2024-05-29 PROCEDURE — 1159F MED LIST DOCD IN RCRD: CPT | Performed by: NURSE PRACTITIONER

## 2024-05-29 PROCEDURE — 3077F SYST BP >= 140 MM HG: CPT | Performed by: NURSE PRACTITIONER

## 2024-05-29 PROCEDURE — 3008F BODY MASS INDEX DOCD: CPT | Performed by: NURSE PRACTITIONER

## 2024-05-29 PROCEDURE — 81003 URINALYSIS AUTO W/O SCOPE: CPT | Performed by: NURSE PRACTITIONER

## 2024-05-29 NOTE — ASSESSMENT & PLAN NOTE
Check blood pressure twice per day with arm cuff bp monitor for 5 days  Record date, time, blood pressure and pulse reading  Send in copy of bp log to me via Thing[employer] or you may call into nurse triage staff  Call if bp consistently > 140/86  Go to ER if any severe headache, chest pain, shortness of breath, visual changes  Please let me know if you have any questions or concerns.

## 2024-05-29 NOTE — PROGRESS NOTES
HPI  Pt presents for pre-op for left cataract surgery by Dr Sammy Witt at some time in June. Will be undergoing twilight sedation. Surgery will be scheduled once clearance obtained.     She had pre-op done in February but surgery was cancelled due to pt changing her mind. She is now ready for surgery and needs to repeat clearance.     No significant medical problems. Does not take any medications besides eye drops.     Has some dryness in her left ear. She applies castor oil but still having some dryness.       Review of Systems   Constitutional:  Negative for activity change, appetite change, fatigue, fever and unexpected weight change.   HENT:  Negative for congestion, ear pain, rhinorrhea and sneezing.    Eyes:  Positive for visual disturbance. Negative for pain and redness.   Respiratory:  Negative for cough, chest tightness, shortness of breath and wheezing.    Cardiovascular:  Negative for chest pain and palpitations.   Gastrointestinal:  Negative for abdominal distention, abdominal pain, constipation, diarrhea, nausea and vomiting.   Genitourinary:  Negative for dysuria, frequency and urgency.   Musculoskeletal:  Negative for back pain, gait problem and myalgias.   Skin:  Negative for color change and rash.   Neurological:  Negative for dizziness, weakness and headaches.   Psychiatric/Behavioral:  Negative for dysphoric mood and sleep disturbance. The patient is not nervous/anxious.        Vitals:    05/28/24 1856 05/28/24 1920   BP: (!) 163/88 124/80   Pulse: 82    Weight: 118 lb (53.5 kg)    Height: 5' 1\" (1.549 m)      Body mass index is 22.3 kg/m².  Wt Readings from Last 6 Encounters:   05/28/24 118 lb (53.5 kg)   02/20/24 119 lb (54 kg)   01/08/24 119 lb (54 kg)   11/28/23 119 lb (54 kg)   03/29/22 124 lb (56.2 kg)   02/07/22 125 lb (56.7 kg)        Health Maintenance   Topic Date Due    Zoster Vaccines (1 of 2) Never done    COVID-19 Vaccine (1 - 2023-24 season) Never done    MA Annual Health  Assessment  2024    Annual Depression Screening  2024    Fall Risk Screening (Annual)  2024    Influenza Vaccine (Season Ended) 10/01/2024    Pneumococcal Vaccine: 65+ Years  Completed       No LMP recorded. Patient is postmenopausal.    Past Medical History:    Abdominal aortic atherosclerosis (HCC)    Anxiety    Aortic calcification (HCC)    Clostridium difficile colitis    Facet arthritis of lumbar region    History of Clostridium difficile colitis    Lichenoid dermatitis    Lumbar facet arthropathy    MENOPAUSE    Pulmonary granuloma (HCC)       .  Past Surgical History:   Procedure Laterality Date    Colonoscopy      nl    Colonoscopy      diverticulosis; no repeat given age    Colonoscopy,biopsy  2013    Procedure: COLONOSCOPY, POSSIBLE BIOPSY, POSSIBLE POLYPECTOMY 98647;  Surgeon: Bernard Carrera MD;  Location: [facility]    Excisional biopsy left      agea 18 abcess removed per pt    Hysterectomy  1971    PARTIAL HYSTERCTOMY FOR FIBROIDS          Other surgical history      LIPOMA-RIGHT UPPER BACK    Other surgical history      LEFT BREAST ABSCESS    Patient documented not to have experienced any of the following events  2013    Procedure: COLONOSCOPY, POSSIBLE BIOPSY, POSSIBLE POLYPECTOMY 01622;  Surgeon: Bernard Carrera MD;  Location: [facility]    Patient withough preoperative order for iv antibiotic surgical site infection prophylaxis.  2013    Procedure: COLONOSCOPY, POSSIBLE BIOPSY, POSSIBLE POLYPECTOMY 97149;  Surgeon: Bernard Carrera MD;  Location: [facility]       Family History   Problem Relation Age of Onset    Hypertension Mother     Obesity Mother     Heart Disorder Mother     Other (Other) Son         EARLY HIP PROBLEMS    No Known Problems Daughter     No Known Problems Son     Cancer Brother         bone     Other (lead poisoning) Son     Obesity Brother        Social History     Socioeconomic  History    Marital status:      Spouse name: Not on file    Number of children: Not on file    Years of education: Not on file    Highest education level: Not on file   Occupational History    Occupation: RETIRED   Tobacco Use    Smoking status: Never    Smokeless tobacco: Never   Substance and Sexual Activity    Alcohol use: Yes     Alcohol/week: 10.0 standard drinks of alcohol     Types: 10 Standard drinks or equivalent per week     Comment: moderate    Drug use: No    Sexual activity: Yes     Partners: Male     Comment: Shinto   Other Topics Concern     Service No    Blood Transfusions No    Caffeine Concern Yes     Comment: 2-3 CUP COFFEE/DAY    Occupational Exposure No    Hobby Hazards No    Sleep Concern No    Stress Concern No    Weight Concern No    Special Diet No    Back Care Not Asked    Exercise Yes    Bike Helmet Not Asked    Seat Belt Yes    Self-Exams Yes    Grew up on a farm Not Asked    History of tanning Not Asked    Outdoor occupation Not Asked    Pt has a pacemaker Not Asked    Pt has a defibrillator Not Asked    Breast feeding Not Asked    Reaction to local anesthetic Not Asked   Social History Narrative    , 4 children, does nanny some     Social Determinants of Health     Financial Resource Strain: Not on file   Food Insecurity: Not on file   Transportation Needs: Not on file   Physical Activity: Not on file   Stress: Not on file   Social Connections: Not on file   Housing Stability: Not on file       Current Outpatient Medications   Medication Sig Dispense Refill    triamcinolone 0.1 % External Ointment Apply to affected area twice a day 15 g 3    ketorolac 0.5 % Ophthalmic Solution INSTILL 1 DROP IN LEFT EYE FOUR TIMES DAILY. START 3 DAYS BEFORE SURGERY (Patient not taking: Reported on 5/28/2024)      ketorolac 0.5 % Ophthalmic Solution Apply 1 drop to eye 2 (two) times daily. INSTILL 1 DROP IN RIGHT EYE TWICE DAILY (Patient not taking: Reported on 5/28/2024)       ofloxacin 0.3 % Ophthalmic Solution INSTILL 1 DROP IN LEFT EYE FOUR TIMES DAILY. START 1 DAY BEFORE SURGERY (Patient not taking: Reported on 5/28/2024)      prednisoLONE 1 % Ophthalmic Suspension SHAKE LIQUID AND INSTILL 1 DROP IN LEFT EYE EVERY 2 HOURS WHILE AWAKE. START AFTER SURGERY. SHAKE WELL. BEFORE USE (Patient not taking: Reported on 5/28/2024)         Allergies:  Allergies   Allergen Reactions    Sulfa Antibiotics HIVES    Pcn [Penicillins] SWELLING       Physical Exam  Vitals and nursing note reviewed.   Constitutional:       General: She is not in acute distress.     Appearance: Normal appearance. She is well-developed and normal weight.   HENT:      Head: Normocephalic and atraumatic.      Right Ear: Tympanic membrane, ear canal and external ear normal.      Left Ear: Tympanic membrane, ear canal and external ear normal.      Ears:      Comments: Mild dryness noted to left ear canal     Nose: Nose normal. No congestion or rhinorrhea.      Mouth/Throat:      Mouth: Mucous membranes are moist.      Pharynx: Oropharynx is clear. No oropharyngeal exudate or posterior oropharyngeal erythema.   Eyes:      General:         Right eye: No discharge.         Left eye: No discharge.      Conjunctiva/sclera: Conjunctivae normal.      Pupils: Pupils are equal, round, and reactive to light.   Neck:      Thyroid: No thyromegaly.   Cardiovascular:      Rate and Rhythm: Normal rate and regular rhythm.      Heart sounds: Normal heart sounds. No murmur heard.  Pulmonary:      Effort: Pulmonary effort is normal. No respiratory distress.      Breath sounds: Normal breath sounds. No wheezing or rales.   Chest:      Chest wall: No tenderness.   Abdominal:      General: Bowel sounds are normal. There is no distension.      Palpations: Abdomen is soft.      Tenderness: There is no abdominal tenderness.   Musculoskeletal:         General: No tenderness. Normal range of motion.      Cervical back: Normal range of motion and  neck supple.   Lymphadenopathy:      Cervical: No cervical adenopathy.   Skin:     General: Skin is warm and dry.      Findings: No rash.   Neurological:      Mental Status: She is alert and oriented to person, place, and time.      Coordination: Coordination normal.   Psychiatric:         Behavior: Behavior normal.         Thought Content: Thought content normal.         Judgment: Judgment normal.         Assessment and Plan:  Problem List Items Addressed This Visit       Combined forms of age-related cataract, bilateral    Relevant Medications    ketorolac 0.5 % Ophthalmic Solution    ketorolac 0.5 % Ophthalmic Solution    ofloxacin 0.3 % Ophthalmic Solution    prednisoLONE 1 % Ophthalmic Suspension    Dermatitis of ear canal, left    Relevant Medications    triamcinolone 0.1 % External Ointment    Pre-op examination - Primary     Screening labs ordered  EKG done within last 6 months  Clearance will be obtained once labs results received.          Relevant Orders    Comp Metabolic Panel (14)    CBC, Platelet; No Differential                   Discussed plan of care with pt and pt is in agreement.All questions answered. Pt to call with questions or concerns.    Encouraged to sign up for My Chart if not already registered.

## 2024-05-29 NOTE — TELEPHONE ENCOUNTER
Action Requested: Summary for Provider     []  Critical Lab, Recommendations Needed  [] Need Additional Advice  []   FYI    []   Need Orders  [] Need Medications Sent to Pharmacy  []  Other     SUMMARY: Patient calling, was seen yesterday for presurgical visit but forgot to mention she has had vaginal itching for about one month. No odor or discharge but would like medication to treat this. Patient also mentioned that she had burning with urination in the last couple of weeks, no other urinary symptoms. She has used cranberry and that seemed to help some with urinary burning. She also used an anti itch cream for vaginal itching but it's not helping her.     Patient scheduled for visit today for evaluation.     Future Appointments   Date Time Provider Department Center   5/29/2024  5:40 PM Cinthya Lyles APRN [facility]         Reason for call: Vaginal Problem  Onset: one month of symptoms       Reason for Disposition   Patient wants to be seen    Protocols used: Vaginal Symptoms-A-[facility]

## 2024-05-29 NOTE — ASSESSMENT & PLAN NOTE
Screening labs ordered  EKG done within last 6 months  Clearance will be obtained once labs results received.

## 2024-05-29 NOTE — PROGRESS NOTES
Vaginal Discharge  The patient's primary symptoms include vaginal discharge.     Pt presents for vaginal itching and white discharge for the past month.  Denies dysuria  Forgot to mention when she was in yesterday for pre-op.   Is using over the counter external vaginal cream    Has sm amount white discharge      Review of Systems   Respiratory:  Negative for shortness of breath.    Cardiovascular:  Negative for chest pain.   Genitourinary:  Positive for vaginal discharge and vaginal pain (plus itching).       Vitals:    05/29/24 1730 05/29/24 1755   BP: (!) 180/98 (!) 160/100   Pulse: 78    Weight: 118 lb (53.5 kg)    Height: 5' 1\" (1.549 m)      Body mass index is 22.3 kg/m².  Wt Readings from Last 6 Encounters:   05/29/24 118 lb (53.5 kg)   05/28/24 118 lb (53.5 kg)   02/20/24 119 lb (54 kg)   01/08/24 119 lb (54 kg)   11/28/23 119 lb (54 kg)   03/29/22 124 lb (56.2 kg)        Health Maintenance   Topic Date Due    Zoster Vaccines (1 of 2) Never done    COVID-19 Vaccine (1 - 2023-24 season) Never done    MA Annual Health Assessment  01/01/2024    Annual Depression Screening  01/01/2024    Fall Risk Screening (Annual)  01/01/2024    Influenza Vaccine (Season Ended) 10/01/2024    Pneumococcal Vaccine: 65+ Years  Completed       No LMP recorded. Patient is postmenopausal.    Past Medical History:    Abdominal aortic atherosclerosis (HCC)    Anxiety    Aortic calcification (HCC)    Clostridium difficile colitis    Facet arthritis of lumbar region    History of Clostridium difficile colitis    Lichenoid dermatitis    Lumbar facet arthropathy    MENOPAUSE    Pulmonary granuloma (HCC)       .  Past Surgical History:   Procedure Laterality Date    Colonoscopy  2001    nl    Colonoscopy  5/13    diverticulosis; no repeat given age    Colonoscopy,biopsy  5/29/2013    Procedure: COLONOSCOPY, POSSIBLE BIOPSY, POSSIBLE POLYPECTOMY 48165;  Surgeon: Bernard Carrera MD;  [facility]  biopsy left      agea 18 abcess removed per pt    Hysterectomy  1971    PARTIAL HYSTERCTOMY FOR FIBROIDS          Other surgical history      LIPOMA-RIGHT UPPER BACK    Other surgical history      LEFT BREAST ABSCESS    Patient documented not to have experienced any of the following events  2013    Procedure: COLONOSCOPY, POSSIBLE BIOPSY, POSSIBLE POLYPECTOMY 26270;  Surgeon: Brenard Carrera MD;  Location: [facility]    Patient withough preoperative order for iv antibiotic surgical site infection prophylaxis.  2013    Procedure: COLONOSCOPY, POSSIBLE BIOPSY, POSSIBLE POLYPECTOMY 56994;  Surgeon: Bernard Carrera MD;  Location: [facility]       Family History   Problem Relation Age of Onset    Hypertension Mother     Obesity Mother     Heart Disorder Mother     Other (Other) Son         EARLY HIP PROBLEMS    No Known Problems Daughter     No Known Problems Son     Cancer Brother         bone     Other (lead poisoning) Son     Obesity Brother        Social History     Socioeconomic History    Marital status:      Spouse name: Not on file    Number of children: Not on file    Years of education: Not on file    Highest education level: Not on file   Occupational History    Occupation: RETIRED   Tobacco Use    Smoking status: Never    Smokeless tobacco: Never   Substance and Sexual Activity    Alcohol use: Yes     Alcohol/week: 10.0 standard drinks of alcohol     Types: 10 Standard drinks or equivalent per week     Comment: moderate    Drug use: No    Sexual activity: Yes     Partners: Male     Comment: Yazidi   Other Topics Concern     Service No    Blood Transfusions No    Caffeine Concern Yes     Comment: 2-3 CUP COFFEE/DAY    Occupational Exposure No    Hobby Hazards No    Sleep Concern No    Stress Concern No    Weight Concern No    Special Diet No    Back Care Not Asked    Exercise Yes    Bike Helmet Not Asked    Seat Belt Yes    Self-Exams  Yes    Grew up on a farm Not Asked    History of tanning Not Asked    Outdoor occupation Not Asked    Pt has a pacemaker Not Asked    Pt has a defibrillator Not Asked    Breast feeding Not Asked    Reaction to local anesthetic Not Asked   Social History Narrative    , 4 children, does  some     Social Determinants of Health     Financial Resource Strain: Not on file   Food Insecurity: Not on file   Transportation Needs: Not on file   Physical Activity: Not on file   Stress: Not on file   Social Connections: Not on file   Housing Stability: Not on file       Current Outpatient Medications   Medication Sig Dispense Refill    ketorolac 0.5 % Ophthalmic Solution INSTILL 1 DROP IN LEFT EYE FOUR TIMES DAILY. START 3 DAYS BEFORE SURGERY (Patient not taking: Reported on 5/28/2024)      ketorolac 0.5 % Ophthalmic Solution Apply 1 drop to eye 2 (two) times daily. INSTILL 1 DROP IN RIGHT EYE TWICE DAILY (Patient not taking: Reported on 5/28/2024)      ofloxacin 0.3 % Ophthalmic Solution INSTILL 1 DROP IN LEFT EYE FOUR TIMES DAILY. START 1 DAY BEFORE SURGERY (Patient not taking: Reported on 5/28/2024)      prednisoLONE 1 % Ophthalmic Suspension SHAKE LIQUID AND INSTILL 1 DROP IN LEFT EYE EVERY 2 HOURS WHILE AWAKE. START AFTER SURGERY. SHAKE WELL. BEFORE USE (Patient not taking: Reported on 5/28/2024)      triamcinolone 0.1 % External Ointment Apply to affected area twice a day 15 g 3       Allergies:  Allergies   Allergen Reactions    Sulfa Antibiotics HIVES    Pcn [Penicillins] SWELLING       Physical Exam  Vitals and nursing note reviewed.   Constitutional:       General: She is not in acute distress.     Appearance: Normal appearance.   Cardiovascular:      Rate and Rhythm: Normal rate.   Pulmonary:      Effort: Pulmonary effort is normal. No respiratory distress.   Genitourinary:     Labia:         Right: No tenderness.         Left: No tenderness.       Vagina: Vaginal discharge (white), erythema and tenderness  present.   Neurological:      Mental Status: She is alert.         Assessment and Plan:  Problem List Items Addressed This Visit       Elevated blood pressure reading in office without diagnosis of hypertension     Check blood pressure twice per day with arm cuff bp monitor for 5 days  Record date, time, blood pressure and pulse reading  Send in copy of bp log to me via [employer] or you may call into nurse triage staff  Call if bp consistently > 140/86  Go to ER if any severe headache, chest pain, shortness of breath, visual changes  Please let me know if you have any questions or concerns.              Vaginal discharge - Primary     Vaginitis panel         Relevant Orders    Vaginitis Vaginosis PCR Panel    URINALYSIS, AUTO, W/O SCOPE                   Discussed plan of care with pt and pt is in agreement.All questions answered. Pt to call with questions or concerns.    Encouraged to sign up for My Chart if not already registered.     Note to patient and family:  The 21st Century Cures Act makes medical notes available to patients in the interest of transparency.  However, please be advised that this is a medical document.  It is intended as a peer to peer communication.  It is written in medical language and may contain abbreviations or verbiage that are technical and unfamiliar.  It may appear blunt or direct.  Medical documents are intended to carry relevant information, facts as evident, and the clinical opinion of the practitioner.

## 2024-05-30 LAB
BV BACTERIA DNA VAG QL NAA+PROBE: NEGATIVE
C GLABRATA DNA VAG QL NAA+PROBE: NEGATIVE
C KRUSEI DNA VAG QL NAA+PROBE: NEGATIVE
CANDIDA DNA VAG QL NAA+PROBE: NEGATIVE
T VAGINALIS DNA VAG QL NAA+PROBE: NEGATIVE

## 2024-06-03 ENCOUNTER — TELEPHONE (OUTPATIENT)
Dept: FAMILY MEDICINE CLINIC | Facility: CLINIC | Age: 84
End: 2024-06-03

## 2024-06-03 ENCOUNTER — LAB ENCOUNTER (OUTPATIENT)
Dept: LAB | Age: 84
End: 2024-06-03
Attending: NURSE PRACTITIONER
Payer: MEDICARE

## 2024-06-03 ENCOUNTER — MED REC SCAN ONLY (OUTPATIENT)
Dept: FAMILY MEDICINE CLINIC | Facility: CLINIC | Age: 84
End: 2024-06-03

## 2024-06-03 DIAGNOSIS — Z01.818 PRE-OP EXAMINATION: ICD-10-CM

## 2024-06-03 DIAGNOSIS — R03.0 ELEVATED BLOOD PRESSURE READING IN OFFICE WITHOUT DIAGNOSIS OF HYPERTENSION: Primary | ICD-10-CM

## 2024-06-03 LAB
ALBUMIN SERPL-MCNC: 3.9 G/DL (ref 3.2–4.8)
ALBUMIN/GLOB SERPL: 1.2 {RATIO} (ref 1–2)
ALP LIVER SERPL-CCNC: 59 U/L
ALT SERPL-CCNC: 12 U/L
ANION GAP SERPL CALC-SCNC: 5 MMOL/L (ref 0–18)
AST SERPL-CCNC: 21 U/L (ref ?–34)
BILIRUB SERPL-MCNC: 0.8 MG/DL (ref 0.2–1.1)
BUN BLD-MCNC: 12 MG/DL (ref 9–23)
BUN/CREAT SERPL: 13.3 (ref 10–20)
CALCIUM BLD-MCNC: 9.5 MG/DL (ref 8.7–10.4)
CHLORIDE SERPL-SCNC: 110 MMOL/L (ref 98–112)
CO2 SERPL-SCNC: 27 MMOL/L (ref 21–32)
CREAT BLD-MCNC: 0.9 MG/DL
DEPRECATED RDW RBC AUTO: 43.9 FL (ref 35.1–46.3)
EGFRCR SERPLBLD CKD-EPI 2021: 62 ML/MIN/1.73M2 (ref 60–?)
ERYTHROCYTE [DISTWIDTH] IN BLOOD BY AUTOMATED COUNT: 12.9 % (ref 11–15)
FASTING STATUS PATIENT QL REPORTED: NO
GLOBULIN PLAS-MCNC: 3.2 G/DL (ref 2–3.5)
GLUCOSE BLD-MCNC: 84 MG/DL (ref 70–99)
HCT VFR BLD AUTO: 37.3 %
HGB BLD-MCNC: 12.8 G/DL
MCH RBC QN AUTO: 31.9 PG (ref 26–34)
MCHC RBC AUTO-ENTMCNC: 34.3 G/DL (ref 31–37)
MCV RBC AUTO: 93 FL
OSMOLALITY SERPL CALC.SUM OF ELEC: 293 MOSM/KG (ref 275–295)
PLATELET # BLD AUTO: 252 10(3)UL (ref 150–450)
POTASSIUM SERPL-SCNC: 3.9 MMOL/L (ref 3.5–5.1)
PROT SERPL-MCNC: 7.1 G/DL (ref 5.7–8.2)
RBC # BLD AUTO: 4.01 X10(6)UL
SODIUM SERPL-SCNC: 142 MMOL/L (ref 136–145)
WBC # BLD AUTO: 7.4 X10(3) UL (ref 4–11)

## 2024-06-03 PROCEDURE — 80053 COMPREHEN METABOLIC PANEL: CPT

## 2024-06-03 PROCEDURE — 36415 COLL VENOUS BLD VENIPUNCTURE: CPT

## 2024-06-03 PROCEDURE — 85027 COMPLETE CBC AUTOMATED: CPT

## 2024-06-03 NOTE — TELEPHONE ENCOUNTER
Blood pressure readings were register in the patient's chart. Log was place on Cinthya's desk.       FYCYNTHIA GUTIÉRREZ

## 2024-06-03 NOTE — TELEPHONE ENCOUNTER
Patient dropped off blood pressure readings for Provider to review. Placed in Provider's folder.

## 2024-06-04 RX ORDER — VALSARTAN 40 MG/1
40 TABLET ORAL DAILY
Qty: 90 TABLET | Refills: 0 | Status: SHIPPED | OUTPATIENT
Start: 2024-06-04

## 2024-06-04 NOTE — TELEPHONE ENCOUNTER
Blood pressures remain a higher than I would like. Let's start a low dose of blood pressure medicine. Please keep checking blood pressure for the next 2 weeks and follow up for repeat blood pressure in office .

## 2024-06-04 NOTE — TELEPHONE ENCOUNTER
Spoke to patient, full name and date of birth verified.   Informed of new prescription for Valsartan, patient will pick that up.  Follow-up blood pressure appointment scheduled for 6/19/24.

## 2024-06-19 ENCOUNTER — OFFICE VISIT (OUTPATIENT)
Dept: FAMILY MEDICINE CLINIC | Facility: CLINIC | Age: 84
End: 2024-06-19

## 2024-06-19 VITALS
HEART RATE: 78 BPM | DIASTOLIC BLOOD PRESSURE: 80 MMHG | BODY MASS INDEX: 21.9 KG/M2 | SYSTOLIC BLOOD PRESSURE: 156 MMHG | HEIGHT: 61 IN | WEIGHT: 116 LBS

## 2024-06-19 DIAGNOSIS — R03.0 ELEVATED BLOOD PRESSURE READING IN OFFICE WITHOUT DIAGNOSIS OF HYPERTENSION: ICD-10-CM

## 2024-06-19 DIAGNOSIS — H25.813 COMBINED FORMS OF AGE-RELATED CATARACT, BILATERAL: Primary | ICD-10-CM

## 2024-06-19 PROCEDURE — 99213 OFFICE O/P EST LOW 20 MIN: CPT | Performed by: NURSE PRACTITIONER

## 2024-06-19 PROCEDURE — 3077F SYST BP >= 140 MM HG: CPT | Performed by: NURSE PRACTITIONER

## 2024-06-19 PROCEDURE — 3079F DIAST BP 80-89 MM HG: CPT | Performed by: NURSE PRACTITIONER

## 2024-06-19 PROCEDURE — G2211 COMPLEX E/M VISIT ADD ON: HCPCS | Performed by: NURSE PRACTITIONER

## 2024-06-19 PROCEDURE — 3008F BODY MASS INDEX DOCD: CPT | Performed by: NURSE PRACTITIONER

## 2024-06-19 PROCEDURE — 1159F MED LIST DOCD IN RCRD: CPT | Performed by: NURSE PRACTITIONER

## 2024-06-19 NOTE — PROGRESS NOTES
Fall      Pt presents for follow up on blood pressure. Started taking valsartan 40 mg as she had episode of syncope after taking her blood pressure medication on Sunday morning.   Was sitting on the porch and got up  to walk in the house and the next thing she knew was that she was on  the floor.     Cataract sx is schedule for July 5th.   Review of Systems   Constitutional:  Negative for activity change and appetite change.   Cardiovascular:  Negative for chest pain, palpitations and leg swelling.   Neurological:  Positive for syncope.       Vitals:    06/19/24 1032   BP: 156/80   Pulse: 78   Weight: 116 lb (52.6 kg)   Height: 5' 1\" (1.549 m)     Body mass index is 21.92 kg/m².  Wt Readings from Last 6 Encounters:   06/19/24 116 lb (52.6 kg)   05/29/24 118 lb (53.5 kg)   05/28/24 118 lb (53.5 kg)   02/20/24 119 lb (54 kg)   01/08/24 119 lb (54 kg)   11/28/23 119 lb (54 kg)        Health Maintenance   Topic Date Due    Zoster Vaccines (1 of 2) Never done    COVID-19 Vaccine (1 - 2023-24 season) Never done    MA Annual Health Assessment  01/01/2024    Annual Depression Screening  01/01/2024    Fall Risk Screening (Annual)  01/01/2024    Influenza Vaccine (Season Ended) 10/01/2024    Pneumococcal Vaccine: 65+ Years  Completed       No LMP recorded. Patient is postmenopausal.    Past Medical History:    Abdominal aortic atherosclerosis (HCC)    Anxiety    Aortic calcification (HCC)    Clostridium difficile colitis    Facet arthritis of lumbar region    History of Clostridium difficile colitis    Lichenoid dermatitis    Lumbar facet arthropathy    MENOPAUSE    Pulmonary granuloma (HCC)       .  Past Surgical History:   Procedure Laterality Date    Colonoscopy  2001    nl    Colonoscopy  5/13    diverticulosis; no repeat given age    Colonoscopy,biopsy  5/29/2013    Procedure: COLONOSCOPY, POSSIBLE BIOPSY, POSSIBLE POLYPECTOMY 41767;  Surgeon: Bernard Carrera MD;  [facility]  biopsy left      agea 18 abcess removed per pt    Hysterectomy  1971    PARTIAL HYSTERCTOMY FOR FIBROIDS          Other surgical history      LIPOMA-RIGHT UPPER BACK    Other surgical history      LEFT BREAST ABSCESS    Patient documented not to have experienced any of the following events  2013    Procedure: COLONOSCOPY, POSSIBLE BIOPSY, POSSIBLE POLYPECTOMY 11226;  Surgeon: Bernard Carrera MD;  Location: [facility]    Patient withough preoperative order for iv antibiotic surgical site infection prophylaxis.  2013    Procedure: COLONOSCOPY, POSSIBLE BIOPSY, POSSIBLE POLYPECTOMY 68822;  Surgeon: Bernard Carrera MD;  Location: [facility]       Family History   Problem Relation Age of Onset    Hypertension Mother     Obesity Mother     Heart Disorder Mother     Other (Other) Son         EARLY HIP PROBLEMS    No Known Problems Daughter     No Known Problems Son     Cancer Brother         bone     Other (lead poisoning) Son     Obesity Brother        Social History     Socioeconomic History    Marital status:      Spouse name: Not on file    Number of children: Not on file    Years of education: Not on file    Highest education level: Not on file   Occupational History    Occupation: RETIRED   Tobacco Use    Smoking status: Never    Smokeless tobacco: Never   Vaping Use    Vaping status: Never Used   Substance and Sexual Activity    Alcohol use: Yes     Alcohol/week: 10.0 standard drinks of alcohol     Types: 10 Standard drinks or equivalent per week     Comment: moderate    Drug use: No    Sexual activity: Yes     Partners: Male     Comment: [employer]   Other Topics Concern     Service No    Blood Transfusions No    Caffeine Concern Yes     Comment: 2-3 CUP COFFEE/DAY    Occupational Exposure No    Hobby Hazards No    Sleep Concern No    Stress Concern No    Weight Concern No    Special Diet No    Back Care Not Asked    Exercise Yes    Bike Helmet  Not Asked    Seat Belt Yes    Self-Exams Yes    Grew up on a farm Not Asked    History of tanning Not Asked    Outdoor occupation Not Asked    Pt has a pacemaker Not Asked    Pt has a defibrillator Not Asked    Breast feeding Not Asked    Reaction to local anesthetic Not Asked   Social History Narrative    , 4 children, does  some     Social Determinants of Health     Financial Resource Strain: Not on file   Food Insecurity: Not on file   Transportation Needs: Not on file   Physical Activity: Not on file   Stress: Not on file   Social Connections: Not on file   Housing Stability: Not on file       Current Outpatient Medications   Medication Sig Dispense Refill    prednisoLONE 1 % Ophthalmic Suspension       triamcinolone 0.1 % External Ointment Apply to affected area twice a day 15 g 3       Allergies:  Allergies   Allergen Reactions    Sulfa Antibiotics HIVES    Pcn [Penicillins] SWELLING       Physical Exam  Constitutional:       Appearance: Normal appearance. She is normal weight.   Cardiovascular:      Rate and Rhythm: Normal rate.   Pulmonary:      Effort: Pulmonary effort is normal. No respiratory distress.   Neurological:      Mental Status: She is alert and oriented to person, place, and time.         Assessment and Plan:  Problem List Items Addressed This Visit       Combined forms of age-related cataract, bilateral - Primary     Has surgery scheduled for July 5th           Elevated blood pressure reading in office without diagnosis of hypertension     Hold valsartan  Check blood pressure twice per day with arm cuff bp monitor for 2 weeks  Record date, time, blood pressure and pulse reading  Send in copy of bp log to me via [employer] or you may call into nurse triage staff  Call if bp consistently > 140/86  Go to ER if any severe headache, chest pain, shortness of breath, visual changes  Please let me know if you have any questions or concerns.                             Discussed plan of care  with pt and pt is in agreement.All questions answered. Pt to call with questions or concerns.    Encouraged to sign up for My Chart if not already registered.     Note to patient and family:  The 21st Century Cures Act makes medical notes available to patients in the interest of transparency.  However, please be advised that this is a medical document.  It is intended as a peer to peer communication.  It is written in medical language and may contain abbreviations or verbiage that are technical and unfamiliar.  It may appear blunt or direct.  Medical documents are intended to carry relevant information, facts as evident, and the clinical opinion of the practitioner.

## 2024-06-19 NOTE — ASSESSMENT & PLAN NOTE
Hold valsartan  Check blood pressure twice per day with arm cuff bp monitor for 2 weeks  Record date, time, blood pressure and pulse reading  Send in copy of bp log to me via [employer] or you may call into nurse triage staff  Call if bp consistently > 140/86  Go to ER if any severe headache, chest pain, shortness of breath, visual changes  Please let me know if you have any questions or concerns.